# Patient Record
Sex: FEMALE | Race: BLACK OR AFRICAN AMERICAN | NOT HISPANIC OR LATINO | URBAN - METROPOLITAN AREA
[De-identification: names, ages, dates, MRNs, and addresses within clinical notes are randomized per-mention and may not be internally consistent; named-entity substitution may affect disease eponyms.]

---

## 2021-01-06 ENCOUNTER — OFFICE VISIT - HEALTHEAST (OUTPATIENT)
Dept: FAMILY MEDICINE | Facility: CLINIC | Age: 61
End: 2021-01-06

## 2021-01-06 DIAGNOSIS — R35.0 URINARY FREQUENCY: ICD-10-CM

## 2021-01-06 DIAGNOSIS — N32.81 OAB (OVERACTIVE BLADDER): ICD-10-CM

## 2021-01-06 LAB
ALBUMIN UR-MCNC: NEGATIVE MG/DL
ANION GAP SERPL CALCULATED.3IONS-SCNC: 8 MMOL/L (ref 5–18)
APPEARANCE UR: CLEAR
BACTERIA #/AREA URNS HPF: ABNORMAL HPF
BILIRUB UR QL STRIP: NEGATIVE
BUN SERPL-MCNC: 13 MG/DL (ref 8–22)
CALCIUM SERPL-MCNC: 9.6 MG/DL (ref 8.5–10.5)
CHLORIDE BLD-SCNC: 107 MMOL/L (ref 98–107)
CO2 SERPL-SCNC: 30 MMOL/L (ref 22–31)
COLOR UR AUTO: YELLOW
CREAT SERPL-MCNC: 0.79 MG/DL (ref 0.6–1.1)
GFR SERPL CREATININE-BSD FRML MDRD: >60 ML/MIN/1.73M2
GLUCOSE BLD-MCNC: 113 MG/DL (ref 70–125)
GLUCOSE UR STRIP-MCNC: NEGATIVE MG/DL
HGB UR QL STRIP: ABNORMAL
KETONES UR STRIP-MCNC: NEGATIVE MG/DL
LEUKOCYTE ESTERASE UR QL STRIP: NEGATIVE
NITRATE UR QL: NEGATIVE
PH UR STRIP: 5.5 [PH] (ref 5–8)
POTASSIUM BLD-SCNC: 5.1 MMOL/L (ref 3.5–5)
RBC #/AREA URNS AUTO: ABNORMAL HPF
SODIUM SERPL-SCNC: 145 MMOL/L (ref 136–145)
SP GR UR STRIP: >=1.03 (ref 1–1.03)
SQUAMOUS #/AREA URNS AUTO: ABNORMAL LPF
UROBILINOGEN UR STRIP-ACNC: ABNORMAL
WBC #/AREA URNS AUTO: ABNORMAL HPF

## 2021-01-06 RX ORDER — AMLODIPINE BESYLATE 10 MG/1
10 TABLET ORAL
Status: SHIPPED | COMMUNITY
Start: 2019-04-19

## 2021-01-06 RX ORDER — ALBUTEROL SULFATE 90 UG/1
1-2 AEROSOL, METERED RESPIRATORY (INHALATION)
Status: SHIPPED | COMMUNITY
Start: 2019-07-08

## 2021-01-06 RX ORDER — GABAPENTIN 300 MG/1
300 CAPSULE ORAL
Status: SHIPPED | COMMUNITY
Start: 2019-04-02

## 2021-01-06 RX ORDER — BENZOCAINE/MENTHOL 6 MG-10 MG
LOZENGE MUCOUS MEMBRANE
Status: SHIPPED | COMMUNITY
Start: 2019-07-08

## 2021-01-08 ENCOUNTER — COMMUNICATION - HEALTHEAST (OUTPATIENT)
Dept: SCHEDULING | Facility: CLINIC | Age: 61
End: 2021-01-08

## 2021-01-25 ENCOUNTER — OFFICE VISIT - HEALTHEAST (OUTPATIENT)
Dept: FAMILY MEDICINE | Facility: CLINIC | Age: 61
End: 2021-01-25

## 2021-01-25 DIAGNOSIS — M25.562 LEFT KNEE PAIN, UNSPECIFIED CHRONICITY: ICD-10-CM

## 2021-01-25 DIAGNOSIS — R32 URINARY INCONTINENCE, UNSPECIFIED TYPE: ICD-10-CM

## 2021-01-25 DIAGNOSIS — M79.89 SWELLING OF LOWER EXTREMITY: ICD-10-CM

## 2021-01-25 DIAGNOSIS — R35.0 URINARY FREQUENCY: ICD-10-CM

## 2021-01-25 RX ORDER — OXYBUTYNIN CHLORIDE 15 MG/1
15 TABLET, EXTENDED RELEASE ORAL DAILY
Qty: 30 TABLET | Refills: 0 | Status: SHIPPED | OUTPATIENT
Start: 2021-01-25 | End: 2022-03-25

## 2021-02-01 ENCOUNTER — COMMUNICATION - HEALTHEAST (OUTPATIENT)
Dept: FAMILY MEDICINE | Facility: CLINIC | Age: 61
End: 2021-02-01

## 2021-03-20 ENCOUNTER — NURSE TRIAGE (OUTPATIENT)
Dept: NURSING | Facility: CLINIC | Age: 61
End: 2021-03-20

## 2021-03-20 ENCOUNTER — COMMUNICATION - HEALTHEAST (OUTPATIENT)
Dept: SCHEDULING | Facility: CLINIC | Age: 61
End: 2021-03-20

## 2021-03-20 ENCOUNTER — TELEPHONE (OUTPATIENT)
Dept: NURSING | Facility: CLINIC | Age: 61
End: 2021-03-20

## 2021-03-20 DIAGNOSIS — R32 URINARY INCONTINENCE, UNSPECIFIED TYPE: ICD-10-CM

## 2021-03-24 ENCOUNTER — COMMUNICATION - HEALTHEAST (OUTPATIENT)
Dept: SCHEDULING | Facility: CLINIC | Age: 61
End: 2021-03-24

## 2021-03-24 DIAGNOSIS — R32 URINARY INCONTINENCE, UNSPECIFIED TYPE: ICD-10-CM

## 2021-03-24 RX ORDER — OXYBUTYNIN CHLORIDE 15 MG/1
15 TABLET, EXTENDED RELEASE ORAL DAILY
Qty: 30 TABLET | Refills: 0 | Status: SHIPPED | OUTPATIENT
Start: 2021-03-24

## 2021-06-01 ENCOUNTER — RECORDS - HEALTHEAST (OUTPATIENT)
Dept: ADMINISTRATIVE | Facility: CLINIC | Age: 61
End: 2021-06-01

## 2021-06-05 VITALS
HEART RATE: 84 BPM | SYSTOLIC BLOOD PRESSURE: 132 MMHG | RESPIRATION RATE: 18 BRPM | WEIGHT: 293 LBS | DIASTOLIC BLOOD PRESSURE: 82 MMHG

## 2021-06-14 NOTE — TELEPHONE ENCOUNTER
Pt calling back wondering if she can take two oxybutynin daily if she doesn't get relief right away.    Writer advised pt to take one daily as prescribed and f/u with PCP within the next two weeks if symptoms persist. Call back if symptoms worsen or new symptoms arise.     Pt is agreeable to plan.     Reason for Disposition    Caller has NON-URGENT medication question about med that PCP prescribed and triager unable to answer question    Protocols used: MEDICATION QUESTION CALL-A-AH

## 2021-06-14 NOTE — TELEPHONE ENCOUNTER
----- Message from Rufina Champion DO sent at 1/31/2021 10:01 PM CST -----  Can we please call this patient and help her make an in person appoint for LE swelling in person, 40 min appt please.     Thanks,  Rufina

## 2021-06-14 NOTE — PROGRESS NOTES
"Liberty Main is a 60 y.o. female who is being evaluated via a billable telephone visit.      What phone number would you like to be contacted at? [phone]  How would you like to obtain your AVS? AVS Preference: Mail a copy.  Assessment & Plan      Urinary frequency  Urinary incontinence, unspecified type  Oxybutynin is not working and patient continues to have significant nocturia, will refer to urology.   - oxybutynin (DITROPAN XL) 15 MG 24 hr tablet  Dispense: 30 tablet; Refill: 0  - Ambulatory referral to Urology - [facility], [place]    LE swelling   I don't know this patient really well and as such don't have a baseline on LE swelling. Concern for cardiac etiology of swelling vs venous stasis. Patient is obese and it is possible that he swelling is from the weight and being sedentary. Last echo in 2018 shows EF 50% with mild LA dilation. LE USN didn't show DVT in 2018 either. Is not having any respioratory issues and no orthopnea. Does have HTN and takes amlodipine which could be causing the swelling. It is difficult to tell. Will have her trial compression stalking and come in to the office for an in-person eval .    Knee pain: difficult to assess over the phone. Patient should come in for an evaluation. Will have nursing call her.     RTO: 1 week for follow up     DO PAUL Hernandez [facility]    Subjective     Liberty Main is 60 y.o.  w/PMHx of Essential HTN, MDD, obesity, DDD who presents today on urinary frequency follow-up.    Patient was seen by me earlier this month after she was unable to see her PCP 2/2 insurance issues.  She did not establish care with me at that time and was waiting for insurance to come through to allow her to go back to her PCP.    Since I saw her last, her insurance has not come through and she will continue to see me for now.    Reports that the oxybutynin that I gave her last time for urinary frequency has not done \"anything\".  Is still " having to go pee several times throughout the night and having difficulty sleeping.  There has been no improvement in her symptoms and no new symptoms are reported.  No dysuria, hematuria, nausea or vomiting.  Continues to have leg swelling.  Is still not amenable to going to PT due to the cost.    Also reports that the swelling in her legs has gotten worse.  Has had leg swelling for a long time and sleeps on the left side.  The left state allows her to keep her legs elevated and she uses 1 pillow to sleep.  Is not having any orthopnea.  No personal history of cardiac disease.  Has never had an echo done in relation to her lower extremity swelling.  Does not have compression stockings that she uses.  Is also having significant knee pain.    Review of Systems a per HPI       Objective       Vitals:  No vitals were obtained today due to virtual visit.    Phone call duration: 10 minutes

## 2021-06-14 NOTE — PROGRESS NOTES
Progress Note     ASSESSMENT/PLAN:    1. Urinary frequency  Urinalysis-UC if Indicated    oxybutynin (DITROPAN XL) 10 MG ER tablet    Basic Metabolic Panel   2. OAB (overactive bladder)         Urinary frequency  UA didn't show any signs of acute cystitis. Per chart review patient has similar sxs that she called into the office for on 4/2020 and 11/2020 but didn't go to the office due to insurance issues. Given that the sxs seem to be long standing in nature, she most likely has urge incontinence 2/2 overactive bladder. Other possible causes - kidney disease, DM ( unlikely 2/2 lack of glucosuria on UA). Will get BMP to check kidney function.  Patient not interested in PT for OAB due to insurance insurance. Will start on Oxybutynin and she should folow up with her PCP in 1 month.   - Urinalysis-UC if Indicated  - oxybutynin (DITROPAN XL) 10 MG ER tablet; Take 1 tablet (10 mg total) by mouth daily.  - Basic Metabolic Panel      There are no discontinued medications.    RTO: 1 month with PCP      CHIEF COMPLAINT:  Chief Complaint   Patient presents with     Urinary Frequency     wakes her up 4 times each night. it is mainly when laying down     Leg Swelling     trouble walking       HISTORY OF PRESENT ILLNESS:  Liberty Main is a 60 y.o. female w/PMHx of Essential HTN, MDD, obesity, DDD who presents today for urinary frequency.     Patient has a PCP, Leonel Lama in the [facility] system but due to some changes to her insurance, she is here ffor acute concern of urinary frequency. Is not interested in establishing care as she is intending to go back to her old PCP once her insurance approves it.     Patient reports increased urinary frequency for many weeks at this time. No dysuria, hematuria, back pain ( has chronic LBP but no new pain)  or CVA tenderness.no fevers, chills, abdominal pain or N/V.  Has a hx of recurrent UTIs and has been on prolonged antibiotics in the past but doesn't recall their names. Patient is 'so  very tired' because she keeps having to get up to go to the bathroom. She wants to be able to go to sleep. The urge to urinate comes on suddenly and difficult to defer. Not drinking a lot of liquid before bed.         REVIEW OF SYSTEMS:   All other systems are negative.      TOBACCO USE:  Social History     Tobacco Use   Smoking Status Never Smoker   Smokeless Tobacco Never Used       MEDICATIONS:  Current Outpatient Medications   Medication Sig Dispense Refill     albuterol (PROAIR HFA;PROVENTIL HFA;VENTOLIN HFA) 90 mcg/actuation inhaler Inhale 1-2 puffs.       amLODIPine (NORVASC) 10 MG tablet Take 10 mg by mouth.       fluticasone propionate (FLONASE) 50 mcg/actuation nasal spray 2 sprays into each nostril.       gabapentin (NEURONTIN) 300 MG capsule Take 300 mg by mouth.       hydrocortisone 1 % cream Apply topically.       oxybutynin (DITROPAN XL) 10 MG ER tablet Take 1 tablet (10 mg total) by mouth daily. 30 tablet 0     No current facility-administered medications for this visit.            There is no immunization history on file for this patient.      VITALS:  Vitals:    01/06/21 1523   BP: 132/82   Patient Site: Right Arm   Patient Position: Sitting   Cuff Size: Adult Large   Pulse: 84   Resp: 18   Weight: (!) 306 lb 12.8 oz (139.2 kg)     Wt Readings from Last 3 Encounters:   01/06/21 (!) 306 lb 12.8 oz (139.2 kg)     There is no height or weight on file to calculate BMI.      PHYSICAL EXAM:  Constitutional:  Reveals a morbidly obese female, tired and in mild distress. Appeared tired.  Vitals:  Per nursing notes.  Cardiac:  Regular rate and rhythm without murmurs, rubs, or gallops.  Legs 1+ edema. Palpation of the radial pulse regular.  Lungs: Clear.  Respiratory effort normal. Lung sounds diminished 2/2 body habitus.   Abdomen:   Bowel sounds positive, nontender, nondistended.no CVA tenderness bilaterally and no suprapubic tenderness.

## 2021-06-14 NOTE — TELEPHONE ENCOUNTER
Attempted to reach patient by phone. I left voicemail relaying message from Provider: Rufina Champion DO sent at 1/31/2021 10:01 PM CST -----  Can we please call this patient and help her make an in person appoint for LE swelling in person, 40 min appt please.   When patient calls back, please assist in scheduling this.

## 2021-06-14 NOTE — TELEPHONE ENCOUNTER
Spoke with patient to schedule her for a visit on January 25. She declined a video and would like a telephone visit

## 2021-06-15 NOTE — TELEPHONE ENCOUNTER
Spoke with pt and scheduled appt with Dr Champion on 2.10.20 for 20 minutes approved reserved spot by Jaycee

## 2021-06-16 PROBLEM — G56.01 CARPAL TUNNEL SYNDROME ON RIGHT: Status: ACTIVE | Noted: 2017-02-25

## 2021-06-16 PROBLEM — G56.21 ULNAR NEUROPATHY OF RIGHT UPPER EXTREMITY: Status: ACTIVE | Noted: 2017-02-26

## 2021-06-16 NOTE — TELEPHONE ENCOUNTER
"Pt seen on 1/25/21 via virtual visit for overactive bladder - medication - Oxybutynin 15 mg 24 hr - 1 tablet daily. They fell down the sink approximately 4 weeks ago. Pt requesting a refill of this medication to be sent to [facility] on [address] in [place]. She has not yet seen [facility].     Message will be forwarded to Dr Champion with request.  Myra Cui RN 03/20/21 7:26 PM  [facility] Nurse Advisor    Reason for Disposition    Caller requesting a NON-URGENT new prescription or refill and triager unable to refill per unit policy    Additional Information    Negative: MORE THAN A DOUBLE DOSE of a prescription or over-the-counter (OTC) drug    Negative: [1] DOUBLE DOSE (an extra dose or lesser amount) of over-the-counter (OTC) drug AND [2] any symptoms (e.g., dizziness, nausea, pain, sleepiness)    Negative: [1] DOUBLE DOSE (an extra dose or lesser amount) of prescription drug AND [2] any symptoms (e.g., dizziness, nausea, pain, sleepiness)    Negative: Took another person's prescription drug    Negative: Drug overdose and nurse unable to answer question    Negative: Caller requesting information not related to medicine    Negative: Caller requesting a prescription for Strep throat and has a positive culture result    Negative: Rash while taking a medication or within 3 days of stopping it    Negative: Immunization reaction suspected    Negative: [1] Asthma and [2] having symptoms of asthma (cough, wheezing, etc)    Negative: [1] DOUBLE DOSE (an extra dose or lesser amount) of prescription drug AND [2] NO symptoms (Exception: a double dose of antibiotics)    Negative: Diabetes drug error or overdose (e.g., insulin or extra dose)    Negative: [1] Request for URGENT new prescription or refill of \"essential\" medication (i.e., likelihood of harm to patient if not taken) AND [2] triager unable to fill per unit policy    Negative: [1] Prescription not at pharmacy AND [2] was prescribed today by PCP    " Negative: Pharmacy calling with prescription questions and triager unable to answer question    Negative: Caller has URGENT medication question about med that PCP prescribed and triager unable to answer question    Negative: Caller has NON-URGENT medication question about med that PCP prescribed and triager unable to answer question    Protocols used: MEDICATION QUESTION CALL-A-AH

## 2021-06-16 NOTE — TELEPHONE ENCOUNTER
oxybutynin (DITROPAN XL) 15 MG 24 hr tablet 30 tablet 0 3/24/2021  --   Sig - Route: Take 1 tablet (15 mg total) by mouth daily. - Oral   Sent to pharmacy as: oxybutynin chloride ER 15 mg tablet,extended release 24 hr   E-Prescribing Status: Receipt confirmed by pharmacy (3/24/2021  9:28 AM CDT)     Requesting a 90 day supply.

## 2021-06-16 NOTE — TELEPHONE ENCOUNTER
Patient needs to establish care for refills. I believe I already sent a month's worth to the pharmacy yesterday.

## 2021-06-16 NOTE — TELEPHONE ENCOUNTER
I can give her a month's worth of Oxybutynin but she really needs to come see me in person for ongoing care so she can establish care with me. We also need to work up her leg swellin.g

## 2021-06-16 NOTE — TELEPHONE ENCOUNTER
Former patient of [facility] & has not established care with another provider.  Please assign refill request to covering provider per Clinic standard process.      RN cannot approve Refill Request    RN can NOT refill this medication no pcp. Last office visit: 1/6/2021 Rufina Champion DO Last Physical: Visit date not found Last MTM visit: Visit date not found Last visit same specialty: Visit date not found.  Next visit within 3 mo: Visit date not found  Next physical within 3 mo: Visit date not found      Miguel A Reid, [facility] Connection Triage/Med Refill 3/25/2021    Requested Prescriptions   Pending Prescriptions Disp Refills     oxybutynin (DITROPAN XL) 15 MG 24 hr tablet [Pharmacy Med Name: OXYBUTYNIN ER 15MG TABLETS] 90 tablet 0     Sig: TAKE 1 TABLET(15 MG) BY MOUTH DAILY       Urinary Incontinence Medications Refill Protocol Passed - 3/24/2021  9:32 AM        Passed - PCP or prescribing provider visit in past 12 months       Last office visit with prescriber/PCP: 1/6/2021 Rufina Champion DO OR same dept: Visit date not found OR same specialty: Visit date not found  Last physical: Visit date not found Last MTM visit: Visit date not found   Next visit within 3 mo: Visit date not found  Next physical within 3 mo: Visit date not found  Prescriber OR PCP: Rufina Champion DO  Last diagnosis associated with med order: 1. Urinary incontinence, unspecified type  - oxybutynin (DITROPAN XL) 15 MG 24 hr tablet [Pharmacy Med Name: OXYBUTYNIN ER 15MG TABLETS]; TAKE 1 TABLET(15 MG) BY MOUTH DAILY  Dispense: 90 tablet; Refill: 0    If protocol passes may refill for 12 months if within 3 months of last provider visit (or a total of 15 months).

## 2021-07-04 NOTE — ADDENDUM NOTE
Addendum Note by Erica Champion DO at 3/24/2021  9:29 AM     Author: Erica Champion DO Service: -- Author Type: Physician    Filed: 3/24/2021  9:29 AM Encounter Date: 3/20/2021 Status: Signed    : Erica Champion DO (Physician)    Addended by: ERICA CHAMPION on: 3/24/2021 09:29 AM        Modules accepted: Orders

## 2021-08-31 ENCOUNTER — NURSE TRIAGE (OUTPATIENT)
Dept: NURSING | Facility: CLINIC | Age: 61
End: 2021-08-31

## 2021-08-31 ENCOUNTER — TELEPHONE (OUTPATIENT)
Dept: EMERGENCY MEDICINE | Facility: CLINIC | Age: 61
End: 2021-08-31

## 2021-08-31 ENCOUNTER — TELEPHONE (OUTPATIENT)
Dept: NURSING | Facility: CLINIC | Age: 61
End: 2021-08-31

## 2021-08-31 NOTE — TELEPHONE ENCOUNTER
"Triage call. Patient calling.    Wants to talk to a nurse, urinating a lot.  Has been going on for a year or two.  Was prescribed oxybutynin. No improvement with medication. No pain or burning. Increased frequency. Per chart review, medication was refilled on 3/25/21. Per MD note, \"Patient needs to establish care for refills.\"    Per protocol, see today in office.  Transferred to scheduling.    Phone call to patient No answer. Left voicemail informing patient that she should be seen at urgent care if no appointments available today.     Nuria Medrano RN 08/31/21 12:11 PM  [facility] Nurse Advisor    Reason for Disposition    Urinating more frequently than usual (i.e., frequency)    Additional Information    Negative: Shock suspected (e.g., cold/pale/clammy skin, too weak to stand, low BP, rapid pulse)    Negative: Sounds like a life-threatening emergency to the triager    Negative: Unable to urinate (or only a few drops) > 4 hours and bladder feels very full (e.g., palpable bladder or strong urge to urinate)    Negative: Decreased urination and drinking very little and dehydration suspected (e.g., dark urine, no urine > 12 hours, very dry mouth, very lightheaded)    Negative: Patient sounds very sick or weak to the triager    Negative: Fever > 100.4 F (38.0 C)    Negative: Side (flank) or lower back pain present    Negative: Can't control passage of urine (i.e., urinary incontinence) and new onset (< 2 weeks) or worsening    Protocols used: URINARY SYMPTOMS-A-OH    COVID 19 Nurse Triage Plan/Patient Instructions    Please be aware that novel coronavirus (COVID-19) may be circulating in the community. If you develop symptoms such as fever, cough, or SOB or if you have concerns about the presence of another infection including coronavirus (COVID-19), please contact your health care provider or visit [email or web address].     Disposition/Instructions    In-Person Visit with provider recommended. Reference " Visit Selection Guide.    Thank you for taking steps to prevent the spread of this virus.  o Limit your contact with others.  o Wear a simple mask to cover your cough.  o Wash your hands well and often.    Resources    [facility]: About COVID-19: [email or web address]    CDC: What to Do If You're Sick: www.cdc.gov/coronavirus/2019-ncov/about/steps-when-sick.html    CDC: Ending Home Isolation: www.cdc.gov/coronavirus/2019-ncov/hcp/disposition-in-home-patients.html     CDC: Caring for Someone: www.cdc.gov/coronavirus/2019-ncov/if-you-are-sick/care-for-someone.html     [facility]: Interim Guidance for Hospital Discharge to Home: www.[email or web address]/coronavirus/hcp/hospdischarge.pdf    [facility] clinical trials (COVID-19 research studies): [email or web address]-clinical-trials     Below are the COVID-19 hotlines at the [place] [facility] [employer] [facility] ([facility]). Interpreters are available.   o For health questions: Call 778-622-0448 or 1-455.477.4979 (7 a.m. to 7 p.m.)  o For questions about schools and childcare: Call 361-022-1953 or 1-808.760.9288 (7 a.m. to 7 p.m.)

## 2021-08-31 NOTE — TELEPHONE ENCOUNTER
Patient calling, reporting she has an appointment scheduled for 9/17 wanting to know the time and addressed. Reviewed appointment details with nothing further needed at this time.     Celeste Chow RN 08/31/21 12:59 PM   [facility] Triage Nurse Advisor

## 2022-06-16 NOTE — TELEPHONE ENCOUNTER
"Pt stated that she missed a call from \"this #\" as she seemed a bit confused couldn't really tell me what exactly she needed help with. I mentioned that I saw there we're notes indicating that scheduling spoke to her, and that an RN had assessed her today. She commented that she was looking to get some medications OTC, recommendations for the time being. Transferred pt to Triage RN line.  " Patient/Family